# Patient Record
Sex: MALE | URBAN - METROPOLITAN AREA
[De-identification: names, ages, dates, MRNs, and addresses within clinical notes are randomized per-mention and may not be internally consistent; named-entity substitution may affect disease eponyms.]

---

## 2021-02-03 DIAGNOSIS — H35.62 SUBRETINAL HEMORRHAGE OF LEFT EYE: Primary | ICD-10-CM

## 2021-02-04 DIAGNOSIS — H43.12 VITREOUS HEMORRHAGE OF LEFT EYE (H): Primary | ICD-10-CM

## 2021-02-05 ENCOUNTER — TELEPHONE (OUTPATIENT)
Dept: OPHTHALMOLOGY | Facility: CLINIC | Age: 86
End: 2021-02-05

## 2021-02-05 NOTE — TELEPHONE ENCOUNTER
Neva Called about scheduling surgery for Dr. Gordillo's [facility] patient at  [facility]      Patient is scheduled for surgery with Dr. Kelly Gordillo     Spoke with: Neva     Date of Surgery: 02/11/21     Location: [facility]:  [address] [place] [id number]     Informed patient they will need an adult : Yes     H&P will be completed at: PCP Dr. Rashmi STRICKLAND testing: [facility]    Post Op scheduled on at [facility] or [facility]     Surgery packet was mailed 02/05     Additional comments: Advised RN will call 1 - 2 business days prior with arrival time and instructions.

## 2021-02-05 NOTE — TELEPHONE ENCOUNTER
Note to surgery scheduler who likely may have reached out-- may message triage back if not    Alan Watkins RN 10:35 AM 02/05/21          [facility]    Phone Message    May a detailed message be left on voicemail: yes     Reason for Call: Other: Pt wife called in stating they had just received a call from this number. Caller was confused because she was under the impression that Lizandro would be treated at the [facility]. Chart unclear - Pt of Dr Gordillo? Please advise    Action Taken: Message routed to:  [facility] E[facility]    Travel Screening: Not Applicable

## 2021-02-08 NOTE — TELEPHONE ENCOUNTER
Neva called to say they had spoken with Dr. Gordillo and have decided to not go forward with surgery at this time. I called and canceled with MADY.